# Patient Record
Sex: MALE | Race: BLACK OR AFRICAN AMERICAN | URBAN - METROPOLITAN AREA
[De-identification: names, ages, dates, MRNs, and addresses within clinical notes are randomized per-mention and may not be internally consistent; named-entity substitution may affect disease eponyms.]

---

## 2022-06-16 ENCOUNTER — TELEPHONE (OUTPATIENT)
Dept: FAMILY MEDICINE CLINIC | Age: 62
End: 2022-06-16

## 2022-06-16 NOTE — TELEPHONE ENCOUNTER
----- Message from Augusto Aleman sent at 6/15/2022  2:38 PM EDT -----  Subject: Appointment Request    Reason for Call: New Patient Request Appointment    QUESTIONS  Type of Appointment? New Patient/New to Provider  Reason for appointment request? Available appointments did not meet   patient need  Additional Information for Provider? Patient requests to be seen to   establish care and to check on blood pressure.  needs a PCP or   he will lose his medical card. Please reach out to the patient with   soonest appt.  ---------------------------------------------------------------------------  --------------  CALL BACK INFO  What is the best way for the office to contact you? OK to leave message on   voicemail  Preferred Call Back Phone Number? [phone]  ---------------------------------------------------------------------------  --------------  SCRIPT ANSWERS  Relationship to Patient? Self  Specialty Confirmation? Primary Care  Is this the first appointment to establish care for a ? No  Have you been diagnosed with COVID-19 in the past 10 days? No  (Service Expert  click yes below to proceed with [employer] As Usual   Scheduling)?  Yes

## 2024-11-11 ENCOUNTER — OFFICE VISIT (OUTPATIENT)
Age: 64
End: 2024-11-11

## 2024-11-11 VITALS
HEIGHT: 69 IN | RESPIRATION RATE: 16 BRPM | SYSTOLIC BLOOD PRESSURE: 158 MMHG | TEMPERATURE: 98.5 F | DIASTOLIC BLOOD PRESSURE: 90 MMHG | BODY MASS INDEX: 29.1 KG/M2 | OXYGEN SATURATION: 95 % | HEART RATE: 105 BPM | WEIGHT: 196.5 LBS

## 2024-11-11 DIAGNOSIS — L01.00 IMPETIGO: Primary | ICD-10-CM

## 2024-11-11 RX ORDER — DOXYCYCLINE HYCLATE 100 MG
100 TABLET ORAL 2 TIMES DAILY
Qty: 14 TABLET | Refills: 0 | Status: SHIPPED | OUTPATIENT
Start: 2024-11-11 | End: 2024-11-18

## 2024-11-11 NOTE — PATIENT INSTRUCTIONS
Thank you for visiting [facility] today.    Patient Instructions:  -Rest and drink plenty of fluids  -Benadryl:  Take 50-75mg of Benadryl at night time for the next 3-5 days  -Zyrtec:  Take Zyrtec in the morning for a daytime antihistamine    If you begin to have shortness of breath or swelling in your mouth or throat, go to the ER.

## 2024-11-11 NOTE — PROGRESS NOTES
2024   Nasir Louis (: 1960) is a 64 y.o. male, New patient, here for evaluation of the following chief complaint(s):  Rash (Pt c/o \"bumps all over\" that started about a week ago. He states they are itchy.)     ASSESSMENT/PLAN:  Below is the assessment and plan developed based on review of pertinent history, physical exam, labs, studies, and medications.       Assessment & Plan  Impetigo  Patient presents with rash of unknown etiology.  Patient advised of treatment plan as indicated below, but discussed if symptoms persist or worsen, they will need to follow up with their PCP or a dermatologist to evaluate further.  Patient agreed.    Pt discharged with instructions to go to Emergency Department for sensation of throat closing, facial swelling, difficulty swallowing, difficulty breathing, shortness of breath, chest pain and uncontrolled fever above 100.4F.   Orders:    doxycycline hyclate (VIBRA-TABS) 100 MG tablet; Take 1 tablet by mouth 2 times daily for 7 days      Handout given with care instructions  OTC for symptom management. Increase fluid intake, ensure adequate nutritional intake.  Follow up with PCP as needed.  Go to ED with development of any acute symptoms.     Follow up:  No follow-ups on file.  Follow up immediately for any new, worsening or changes or if symptoms are not improving over the next 5-7 days.     SUBJECTIVE/OBJECTIVE:    History provided by:  Patient   used: No           Rash (Pt c/o \"bumps all over\" that started about a week ago. He states they are itchy.)      No results found for any visits on 24.    No results found for this visit on 24.  XR Results (most recent):  @BSHSILASTIMGCAT(IZN0339:1)@         Review of Systems   Constitutional: Negative.    HENT: Negative.     Eyes: Negative.    Respiratory: Negative.     Cardiovascular: Negative.    Gastrointestinal: Negative.    Endocrine: Negative.    Genitourinary: Negative.

## 2025-03-18 ENCOUNTER — TELEPHONE (OUTPATIENT)
Age: 65
End: 2025-03-18

## 2025-03-18 ENCOUNTER — OFFICE VISIT (OUTPATIENT)
Age: 65
End: 2025-03-18

## 2025-03-18 VITALS
HEIGHT: 69 IN | RESPIRATION RATE: 16 BRPM | SYSTOLIC BLOOD PRESSURE: 138 MMHG | BODY MASS INDEX: 29.33 KG/M2 | TEMPERATURE: 98 F | OXYGEN SATURATION: 92 % | HEART RATE: 102 BPM | DIASTOLIC BLOOD PRESSURE: 91 MMHG | WEIGHT: 198 LBS

## 2025-03-18 DIAGNOSIS — R03.0 ELEVATED BLOOD PRESSURE READING: ICD-10-CM

## 2025-03-18 DIAGNOSIS — S39.92XA BACK INJURY, INITIAL ENCOUNTER: ICD-10-CM

## 2025-03-18 DIAGNOSIS — R00.0 TACHYCARDIA, UNSPECIFIED: Primary | ICD-10-CM

## 2025-03-18 RX ORDER — NAPROXEN 500 MG/1
500 TABLET ORAL 2 TIMES DAILY PRN
Qty: 60 TABLET | Refills: 0 | Status: SHIPPED | OUTPATIENT
Start: 2025-03-18

## 2025-03-18 RX ORDER — CYCLOBENZAPRINE HCL 10 MG
10 TABLET ORAL 3 TIMES DAILY PRN
Qty: 21 TABLET | Refills: 0 | Status: SHIPPED | OUTPATIENT
Start: 2025-03-18 | End: 2025-03-28

## 2025-03-18 RX ORDER — METHOCARBAMOL 750 MG/1
750 TABLET, FILM COATED ORAL 4 TIMES DAILY
Qty: 40 TABLET | Refills: 0 | Status: SHIPPED | OUTPATIENT
Start: 2025-03-18 | End: 2025-03-18

## 2025-03-18 RX ORDER — LOSARTAN POTASSIUM 25 MG/1
TABLET ORAL
COMMUNITY
Start: 2024-05-05

## 2025-03-18 RX ORDER — HYDROCHLOROTHIAZIDE 12.5 MG/1
TABLET ORAL
COMMUNITY
Start: 2024-05-05

## 2025-03-18 NOTE — TELEPHONE ENCOUNTER
Could not leave .  not set up yet.   Attempted to schedule new patient referral appt. Can schedule with any cardiologist for Tachycardia, unspecified  R03.0 (ICD-10-CM) - Elevated blood pressure reading

## 2025-03-18 NOTE — PROGRESS NOTES
BP Cuff Size: Large Adult Large Adult   Pulse: (!) 102    Resp: 16    Temp: 98 °F (36.7 °C)    TempSrc: Oral    SpO2: 92%    Weight: 89.8 kg (198 lb)    Height: 1.753 m (5' 9\")            Physical Exam  Vitals and nursing note reviewed.   Constitutional:       Appearance: Normal appearance.   HENT:      Head: Normocephalic and atraumatic.      Nose: Nose normal.      Mouth/Throat:      Mouth: Mucous membranes are moist.   Eyes:      Extraocular Movements: Extraocular movements intact.      Pupils: Pupils are equal, round, and reactive to light.   Cardiovascular:      Rate and Rhythm: Normal rate and regular rhythm.      Heart sounds: Normal heart sounds.   Pulmonary:      Effort: Pulmonary effort is normal.      Breath sounds: Normal breath sounds.   Abdominal:      General: Abdomen is flat.      Palpations: Abdomen is soft.   Musculoskeletal:         General: Tenderness present.      Cervical back: Normal range of motion and neck supple.   Skin:     General: Skin is warm and dry.   Neurological:      General: No focal deficit present.      Mental Status: He is alert and oriented to person, place, and time.   Psychiatric:         Mood and Affect: Mood normal.         Behavior: Behavior normal.         Thought Content: Thought content normal.         Judgment: Judgment normal.          We discussed he will need to follow up with cardiology for elevated blood pressure and heart rate. Additionally, he will need to follow up with PT for back pain that continues post rest, ice, and anti-inflammatory medication. Patient verbalized understanding.    An electronic signature was used to authenticate this note.    Carine Platt, APRN - CNP

## 2025-05-01 ENCOUNTER — TELEPHONE (OUTPATIENT)
Age: 65
End: 2025-05-01

## 2025-05-01 NOTE — TELEPHONE ENCOUNTER
Attempted to call and schedule new pt referral appt. [facility] has not been set up and could not leave message.